# Patient Record
Sex: FEMALE | Race: WHITE | NOT HISPANIC OR LATINO | Employment: OTHER | URBAN - METROPOLITAN AREA
[De-identification: names, ages, dates, MRNs, and addresses within clinical notes are randomized per-mention and may not be internally consistent; named-entity substitution may affect disease eponyms.]

---

## 2018-04-24 ENCOUNTER — TELEPHONE (OUTPATIENT)
Dept: GASTROENTEROLOGY | Facility: CLINIC | Age: 63
End: 2018-04-24

## 2018-04-24 NOTE — TELEPHONE ENCOUNTER
Ma spoke with pt , appt scheduled for 5/8 some of patient records received pt states she will bring more the day of her visit

## 2018-04-24 NOTE — TELEPHONE ENCOUNTER
----- Message from Annia Mccall sent at 4/23/2018 11:33 AM CDT -----  Contact: Matt Lugo- [phone]  Kyle Urena called to schedule a f/u appt on the pts behalf- chron's flare up- please call pt back at [phone]

## 2018-05-08 ENCOUNTER — OFFICE VISIT (OUTPATIENT)
Dept: GASTROENTEROLOGY | Facility: CLINIC | Age: 63
End: 2018-05-08
Payer: MEDICARE

## 2018-05-08 VITALS
SYSTOLIC BLOOD PRESSURE: 181 MMHG | WEIGHT: 155.44 LBS | DIASTOLIC BLOOD PRESSURE: 83 MMHG | BODY MASS INDEX: 30.52 KG/M2 | HEART RATE: 55 BPM | HEIGHT: 60 IN

## 2018-05-08 DIAGNOSIS — R10.31 CHRONIC BILATERAL LOWER ABDOMINAL PAIN: Primary | ICD-10-CM

## 2018-05-08 DIAGNOSIS — R10.32 CHRONIC BILATERAL LOWER ABDOMINAL PAIN: Primary | ICD-10-CM

## 2018-05-08 DIAGNOSIS — G89.29 CHRONIC BILATERAL LOWER ABDOMINAL PAIN: Primary | ICD-10-CM

## 2018-05-08 PROCEDURE — 99213 OFFICE O/P EST LOW 20 MIN: CPT | Mod: PBBFAC | Performed by: INTERNAL MEDICINE

## 2018-05-08 PROCEDURE — 99999 PR PBB SHADOW E&M-EST. PATIENT-LVL III: CPT | Mod: PBBFAC,,, | Performed by: INTERNAL MEDICINE

## 2018-05-08 PROCEDURE — 99204 OFFICE O/P NEW MOD 45 MIN: CPT | Mod: S$PBB,,, | Performed by: INTERNAL MEDICINE

## 2018-05-08 RX ORDER — TRAMADOL HYDROCHLORIDE 50 MG/1
50 TABLET ORAL
COMMUNITY

## 2018-05-08 RX ORDER — METOPROLOL SUCCINATE 50 MG/1
50 TABLET, EXTENDED RELEASE ORAL
COMMUNITY
Start: 2017-08-10 | End: 2018-08-10

## 2018-05-08 RX ORDER — HYDROCHLOROTHIAZIDE 25 MG/1
25 TABLET ORAL DAILY
COMMUNITY

## 2018-05-08 RX ORDER — MONTELUKAST SODIUM 10 MG/1
10 TABLET ORAL NIGHTLY
COMMUNITY

## 2018-05-08 RX ORDER — GABAPENTIN 300 MG/1
600 CAPSULE ORAL
COMMUNITY

## 2018-05-08 RX ORDER — DIPHENOXYLATE HYDROCHLORIDE AND ATROPINE SULFATE 2.5; .025 MG/1; MG/1
1 TABLET ORAL 4 TIMES DAILY PRN
COMMUNITY

## 2018-05-08 RX ORDER — CLONAZEPAM 0.5 MG/1
0.5 TABLET ORAL
COMMUNITY

## 2018-05-08 RX ORDER — SUCRALFATE 1 G/1
1 TABLET ORAL 4 TIMES DAILY
COMMUNITY

## 2018-05-08 RX ORDER — MINERAL OIL
180 ENEMA (ML) RECTAL
COMMUNITY
Start: 2018-04-03

## 2018-05-08 RX ORDER — TRAZODONE HYDROCHLORIDE 150 MG/1
150 TABLET ORAL
COMMUNITY
Start: 2018-03-26

## 2018-05-08 NOTE — LETTER
May 8, 2018      Darin Lugo MD  [address] [place] [address]           [facility]  [address]  [facility] [address]  Phone: [phone]  Fax: [phone]          Patient: Shelly Cormier   MR Number: [id number]   YOB: 1955   Date of Visit: 5/8/2018       Dear Dr. Darin Lugo:    Thank you for referring Shelly Cormier to me for evaluation. Attached you will find relevant portions of my assessment and plan of care.    If you have questions, please do not hesitate to call me. I look forward to following Shelly Cormier along with you.    Sincerely,    Miguel Angel Valle MD    Enclosure  CC:  No Recipients    If you would like to receive this communication electronically, please contact [email or web address] or [phone] to request more information on [employer] Link access.    For providers and/or their staff who would like to refer a patient to [facility], please contact us through our one-stop-shop provider referral line, [facility], at [phone].    If you feel you have received this communication in error or would no longer like to receive these types of communications, please e-mail [email or web address]

## 2018-05-08 NOTE — PROGRESS NOTES
REASON FOR VISIT:  Chronic abdominal pain.    HISTORY OF PRESENT ILLNESS:  Ms. Cormier was once seen before in 2014 for chronic   right lower quadrant abdominal pain upon referral from Dr. Darin Lugo in   [place], [place].  At that time, she had been complaining of abdominal pain   for the past five years.  She had previously been thought to have Crohn disease   based on imaging, but no concrete evidence of Crohn disease was ever found.  She   even underwent a CT enterography, video capsule endoscopy and colonoscopy here,   which was unremarkable.  Biopsies from the terminal ileum and ileocecal valve   unremarkable.  The CT enterography done here in July 2014 revealed cavernous   transformation of the portal vein and inflammatory changes, likely chronic near   the ileocecal junction.  Recently investigation done by Dr. Lugo in April 2018 including CT scan reveals similar findings of inflammation around the   terminal ileum with tethering of the small bowel and the right colon region,   although a colonoscopy and endoscopy were unrevealing including the TI being   normal.  Given her chronic abdominal pain and multiple hospital admits, chronic   nausea, which keeps her awake with constant feeling of sense of vomiting,   abdominal bloating, mostly diarrhea with intermittent constipation, has brought   her here to seek a second opinion.  Today upon reviewing her CT imaging and   recent endoscopic evaluation and rather unremarkable labs, I have offered to   discuss the case with Dr. Kathy Kirkpatrick, inflammatory bowel disease specialist   and with Dr. Hector Loredo, colorectal surgeon, to determine if this surgical   approach would be appropriate at this time, i.e. laparoscopic evaluation with   possible ileocecectomy for diagnosis and possible resection of the inflamed   section of the bowel.    PAST MEDICAL, SURGICAL, SOCIAL AND FAMILY HISTORY:  Unremarkable except for   chronic migraine.    REVIEW OF  SYSTEMS:  CONSTITUTIONAL:  No fever, no chills, no weight loss.  Appetite is stable.  EYES:  No visual changes, no red eyes.  ENT:  No odynophagia or hoarseness of voice.  CARDIOVASCULAR:  No angina or palpitation.  RESPIRATORY:  No shortness breath or wheezing.  GENITOURINARY:  No dysuria or hematuria.  MUSCULOSKELETAL:  Occasional myalgias and arthralgias.  SKIN:  No pruritus, eczema, no painful nodules on the skin, no ulceration.  NEUROLOGIC:  No headache, no seizures.  PSYCHIATRIC:  No anxiety or depression.  GASTROINTESTINAL:  See HPI.    PHYSICAL EXAMINATION:  VITAL SIGNS:  See Epic.  GENERAL:  Awake, alert and oriented x3, no acute distress.  NECK:  Supple, no carotid bruit.  No cervical adenopathy.  ABDOMEN:  Obese, soft, diffuse tenderness to palpation, but mostly in the right   lower quadrant.  No guarding or rigidity.  Bowel sounds are hyperactive.  No   abdominal bruits heard.  EYES:  Conjunctivae anicteric.  ENT:  Oropharynx, mucosa moist.  Mallampati 3.  CARDIOVASCULAR:  S1, S2 normal.  RESPIRATORY:  Bilateral air entry equal.  SKIN:  No palmar erythema or spider angiomata.  NEUROLOGIC:  No asterixis.  PSYCHIATRY:  Affect appropriate.  LOWER EXTREMITIES:  No pedal edema.    IMPRESSION:  Chronic lower abdominal pain with possible chronic inflammatory   stricture involving the terminal ileum.    RECOMMENDATION:  We will discuss with Dr. Kathy Kirkpatrick and Dr. Hector Loredo to   determine if surgical evaluation is necessary at this time.  I suspect we may   not have a diagnosis without surgical evaluation given the longevity of her   symptoms and no clear diagnosis despite multiple endoscopic evaluations and CT   imagings.  We will call the patient with the final recommendation.      ACT/HN  dd: 05/08/2018 15:13:11 (CDT)  td: 05/09/2018 02:36:48 (CDT)  Doc ID   #[id number]  Job ID #[id number]    CC:

## 2018-05-11 ENCOUNTER — TELEPHONE (OUTPATIENT)
Dept: GASTROENTEROLOGY | Facility: CLINIC | Age: 63
End: 2018-05-11

## 2018-05-11 NOTE — TELEPHONE ENCOUNTER
----- Message from Miguel Angel Valle MD sent at 5/11/2018  8:33 AM CDT -----  Please call her and let her know that I've not been able to get in touch with the surgeon yet. I'll let her know ASAP.

## 2018-05-11 NOTE — TELEPHONE ENCOUNTER
----- Message from Ayanna Randolph sent at 5/10/2018  4:32 PM CDT -----  Contact: self - [phone] or   Bryan - wants to know if the surgeons have reviewed her records - still has stomach pain and diarrhea - please call patient at  or

## 2018-05-11 NOTE — TELEPHONE ENCOUNTER
----- Message from Annia Mccall sent at 5/11/2018  4:05 PM CDT -----  Contact: Self- [phone] or [phone]  Leonard- pt called to determine if there were any updates on hearing from her surgeon yet- please contact pt at [phone] or [phone]

## 2018-05-11 NOTE — TELEPHONE ENCOUNTER
Ma spoke with pt , informed her dr cee was still waiting on surgeon will call her ASAP pt verbalized understanding

## 2018-05-14 ENCOUNTER — TELEPHONE (OUTPATIENT)
Dept: GASTROENTEROLOGY | Facility: CLINIC | Age: 63
End: 2018-05-14

## 2018-05-14 NOTE — TELEPHONE ENCOUNTER
Dr. Valle,  I spoke with the patient 3 times on Friday and informed her that you will get back with her ASAP once you speak with Dr. Loredo and Dr. PAPITO Kirkpatrick  Please advise

## 2018-05-14 NOTE — TELEPHONE ENCOUNTER
----- Message from Leslie Rudolph MA sent at 5/14/2018  1:27 PM CDT -----  Contact: Home: [phone] or  [phone]       Who Called: Self     Communication Preference (Lisat response to Pt. (or) Call Back # and timeframe): Home: [phone] or  [phone]  Additional Information: Pt states that Dr Valle told her that he would be speaking to a surgeon about doing surgery on her colon and she is wanting to know if he had the chance to do that yet and what was decided?

## 2018-05-14 NOTE — TELEPHONE ENCOUNTER
Ma spoke with pt and dr cee and patient was advised that if her pain level is a 10 she needs to go to the er, patient was also informed that dr duarte will reach out to her in 2 weeks to schedule an appt once he reviews hier records. Pt verbalized understanding .

## 2018-05-14 NOTE — TELEPHONE ENCOUNTER
----- Message from Annia Cal sent at 5/14/2018  3:33 PM CDT -----  Contact: Self- [phone] or [phone]  Leonard- pt called to determine if Dr. Valle spoke with a surgeon yet on getting her scheduled- states she is having pain in her colon- pain level 10 currently- please contact pt at [phone] or [phone]

## 2018-05-29 ENCOUNTER — LAB VISIT (OUTPATIENT)
Dept: LAB | Facility: HOSPITAL | Age: 63
End: 2018-05-29
Payer: MEDICARE

## 2018-05-29 ENCOUNTER — OFFICE VISIT (OUTPATIENT)
Dept: SURGERY | Facility: CLINIC | Age: 63
End: 2018-05-29
Payer: MEDICARE

## 2018-05-29 VITALS
HEIGHT: 60 IN | SYSTOLIC BLOOD PRESSURE: 166 MMHG | HEART RATE: 68 BPM | DIASTOLIC BLOOD PRESSURE: 64 MMHG | BODY MASS INDEX: 30.04 KG/M2 | WEIGHT: 153 LBS

## 2018-05-29 DIAGNOSIS — K52.9 CHRONIC DIARRHEA: ICD-10-CM

## 2018-05-29 DIAGNOSIS — K52.9 CHRONIC DIARRHEA: Primary | ICD-10-CM

## 2018-05-29 DIAGNOSIS — R10.31 RLQ ABDOMINAL PAIN: ICD-10-CM

## 2018-05-29 LAB
ALBUMIN SERPL BCP-MCNC: 3.5 G/DL
ALP SERPL-CCNC: 112 U/L
ALT SERPL W/O P-5'-P-CCNC: 19 U/L
ANION GAP SERPL CALC-SCNC: 9 MMOL/L
AST SERPL-CCNC: 27 U/L
BASOPHILS # BLD AUTO: 0.03 K/UL
BASOPHILS NFR BLD: 0.4 %
BILIRUB SERPL-MCNC: 1 MG/DL
BUN SERPL-MCNC: 14 MG/DL
CALCIUM SERPL-MCNC: 9.4 MG/DL
CHLORIDE SERPL-SCNC: 107 MMOL/L
CO2 SERPL-SCNC: 27 MMOL/L
CREAT SERPL-MCNC: 0.9 MG/DL
CRP SERPL-MCNC: 20.7 MG/L
DIFFERENTIAL METHOD: ABNORMAL
EOSINOPHIL # BLD AUTO: 0.1 K/UL
EOSINOPHIL NFR BLD: 1.6 %
ERYTHROCYTE [DISTWIDTH] IN BLOOD BY AUTOMATED COUNT: 16 %
ERYTHROCYTE [SEDIMENTATION RATE] IN BLOOD BY WESTERGREN METHOD: 38 MM/HR
EST. GFR  (AFRICAN AMERICAN): >60 ML/MIN/1.73 M^2
EST. GFR  (NON AFRICAN AMERICAN): >60 ML/MIN/1.73 M^2
GLUCOSE SERPL-MCNC: 98 MG/DL
HCT VFR BLD AUTO: 37.2 %
HGB BLD-MCNC: 11.5 G/DL
IMM GRANULOCYTES # BLD AUTO: 0.02 K/UL
IMM GRANULOCYTES NFR BLD AUTO: 0.2 %
LYMPHOCYTES # BLD AUTO: 1.2 K/UL
LYMPHOCYTES NFR BLD: 14.5 %
MCH RBC QN AUTO: 26.8 PG
MCHC RBC AUTO-ENTMCNC: 30.9 G/DL
MCV RBC AUTO: 87 FL
MONOCYTES # BLD AUTO: 1 K/UL
MONOCYTES NFR BLD: 11.7 %
NEUTROPHILS # BLD AUTO: 5.8 K/UL
NEUTROPHILS NFR BLD: 71.6 %
NRBC BLD-RTO: 0 /100 WBC
PLATELET # BLD AUTO: 114 K/UL
PMV BLD AUTO: 13.7 FL
POTASSIUM SERPL-SCNC: 4.4 MMOL/L
PROT SERPL-MCNC: 7.7 G/DL
RBC # BLD AUTO: 4.29 M/UL
SODIUM SERPL-SCNC: 143 MMOL/L
WBC # BLD AUTO: 8.11 K/UL

## 2018-05-29 PROCEDURE — 85025 COMPLETE CBC W/AUTO DIFF WBC: CPT

## 2018-05-29 PROCEDURE — 99213 OFFICE O/P EST LOW 20 MIN: CPT | Mod: PBBFAC | Performed by: COLON & RECTAL SURGERY

## 2018-05-29 PROCEDURE — 36415 COLL VENOUS BLD VENIPUNCTURE: CPT

## 2018-05-29 PROCEDURE — 99999 PR PBB SHADOW E&M-EST. PATIENT-LVL III: CPT | Mod: PBBFAC,,, | Performed by: COLON & RECTAL SURGERY

## 2018-05-29 PROCEDURE — 99204 OFFICE O/P NEW MOD 45 MIN: CPT | Mod: S$PBB,,, | Performed by: COLON & RECTAL SURGERY

## 2018-05-29 PROCEDURE — 80053 COMPREHEN METABOLIC PANEL: CPT

## 2018-05-29 PROCEDURE — 85651 RBC SED RATE NONAUTOMATED: CPT

## 2018-05-29 PROCEDURE — 86140 C-REACTIVE PROTEIN: CPT

## 2018-05-29 RX ORDER — ONDANSETRON HYDROCHLORIDE 8 MG/1
8 TABLET, FILM COATED ORAL EVERY 8 HOURS PRN
Qty: 30 TABLET | Refills: 3 | Status: SHIPPED | OUTPATIENT
Start: 2018-05-29

## 2018-05-29 RX ORDER — HYOSCYAMINE SULFATE 0.125 MG
0.12 TABLET ORAL
COMMUNITY
Start: 2016-08-16

## 2018-05-29 NOTE — PROGRESS NOTES
"Subjective:       Patient ID: Shelly Cormier is a 63 y.o. female.    Chief Complaint: No chief complaint on file.    Ms. Corimer is a 62 yo F who was referred by Dr. Valle (gastroenterology) for evaluation for possible surgical diagnosis/treatment of possible crohn's disease. She has had chronic abdominal pain and diarrhea for years.     She was thought to have Crohn's disease but no pathologic diagnosis has ever been made. She follows with gastroenterology in [place], [place] (Dr. Darin Lugo). She says she has had abdominal pain and diarrhea >10 years. This was bad ~5-6 years ago. The only thing that has worked for her pain and diarrhea has been hospital admissions with IVF and antibiotics. She tried steroids which "made her crazy." She has tried pentasa in the past with no improvement. She is currently on no medications for Crohn's. She had a period of 5 years where things were tolerable but the diarrhea, pain and nausea have been bad for the last 6 months.     She has also been diagnosed with IBS - she has taken hyoscyamine, which she doesn't think has helped.  She has not lost weight. She does have chronic nausea for which she takes phenergan. No emesis. No blood in the diarrhea. She does have occasional incontinence to stool. She feels like she "does not digest food right." The pain is often in the RLQ but moves throughout her abdomen. It is constant. Her pain is not relieved by eating or bowel movements.    Of note, she does have a portal vein thrombosis, splenomegaly and esophageal varices on recent CT scan.     C-scope 4/18/2018 ([facility]) - tubular adenoma of transverse colon, Normal appearing terminal ileum and biopsies with normal mucosa.   C-scope 2016 ([facility])- also normal T.I. And random colon biopsy with normal mucosa as well  C-scope 2014 (Dr. Valle) - normal T.I. And colon, biopsies normal as well  CTE 07/2014 - reportedly (unable to review) portal vein embolism and possible chronic " inflammatory changes near ileocecal junction  Capsule endoscopy 2014 - normal exam of small intestine, reached colon      No family hx of CRC. Her maternal aunt has UC. No other family history of IBD.     PSH:  Appendectomy (open) with SAL - at age 29y  Laparoscopic cholecystectomy  Laparoscopic Nissen fundoplication    Review of patient's allergies indicates:   Allergen Reactions    Corticosteroids (glucocorticoids)     Hydrocodone     Lorcet 10/650 [hydrocodone-acetaminophen]        Past Medical History:   Diagnosis Date    Anxiety     GERD (gastroesophageal reflux disease)     History of migraine headaches     Hypertension     Portal vein thrombosis     after the fundoplication in 2006. Was on anticoagulation for a short time.    Seasonal allergies        Past Surgical History:   Procedure Laterality Date    APPENDECTOMY      CHOLECYSTECTOMY  2013    GASTRIC FUNDOPLICATION      TONSILLECTOMY      TOTAL ABDOMINAL HYSTERECTOMY         Current Outpatient Prescriptions   Medication Sig Dispense Refill    clonazePAM (KLONOPIN) 0.5 MG tablet Take 0.5 mg by mouth.      diphenoxylate-atropine 2.5-0.025 mg (LOMOTIL) 2.5-0.025 mg per tablet Take 1 tablet by mouth 4 (four) times daily as needed for Diarrhea.      fexofenadine (ALLEGRA) 180 MG tablet Take 180 mg by mouth.      gabapentin (NEURONTIN) 300 MG capsule Take 600 mg by mouth.      hydroCHLOROthiazide (HYDRODIURIL) 25 MG tablet Take 25 mg by mouth once daily.      hyoscyamine (ANASPAZ,LEVSIN) 0.125 mg Tab Take 0.125 mg by mouth.      metoprolol succinate (TOPROL-XL) 50 MG 24 hr tablet Take 50 mg by mouth.      montelukast (SINGULAIR) 10 mg tablet Take 10 mg by mouth every evening.      omeprazole (PRILOSEC ORAL) Take by mouth.      promethazine (PHENERGAN) 50 MG tablet Take 50 mg by mouth every 4 (four) hours.      ranitidine (ZANTAC) 150 MG tablet       sucralfate (CARAFATE) 1 gram tablet Take 1 g by mouth 4 (four) times daily.       sumatriptan (IMITREX) 100 MG tablet Take 100 mg by mouth every 2 (two) hours as needed for Migraine.      traMADol (ULTRAM) 50 mg tablet Take 50 mg by mouth.      traZODone (DESYREL) 150 MG tablet Take 150 mg by mouth.      ondansetron (ZOFRAN) 8 MG tablet Take 1 tablet (8 mg total) by mouth every 8 (eight) hours as needed for Nausea. 30 tablet 3     No current facility-administered medications for this visit.        Family History   Problem Relation Age of Onset    Ulcerative colitis Maternal Aunt        Social History     Social History    Marital status:      Spouse name: N/A    Number of children: N/A    Years of education: N/A     Social History Main Topics    Smoking status: Never Smoker    Smokeless tobacco: None    Alcohol use No    Drug use: Unknown    Sexual activity: Not Asked     Other Topics Concern    None     Social History Narrative    None       Review of Systems   Constitutional: Positive for fatigue. Negative for chills, diaphoresis, fever and unexpected weight change.   HENT: Negative for congestion.    Eyes: Negative for visual disturbance.   Respiratory: Negative for cough and shortness of breath.    Cardiovascular: Negative for chest pain.   Gastrointestinal: Positive for abdominal pain, diarrhea and nausea. Negative for anal bleeding, blood in stool, constipation, rectal pain and vomiting.   Genitourinary: Negative for dysuria.   Musculoskeletal: Negative for arthralgias.   Skin: Negative for color change.   Neurological: Negative for dizziness and syncope.   Hematological: Negative for adenopathy.   Psychiatric/Behavioral: Negative for agitation.       Objective:      Physical Exam   Constitutional: She is oriented to person, place, and time. She appears well-developed and well-nourished.   HENT:   Head: Normocephalic and atraumatic.   Eyes: EOM are normal.   Cardiovascular: Normal rate and regular rhythm.    Pulmonary/Chest: Effort normal. No respiratory distress.    Abdominal: Soft. She exhibits no distension and no mass. There is tenderness (mild and diffuse throughout abdomen). There is no rebound and no guarding. No hernia.   Previous pfannenstiel incision well healed  Prior laparoscopic incisions well healed   Musculoskeletal: Normal range of motion.   Neurological: She is alert and oriented to person, place, and time.   Skin: Skin is warm. Capillary refill takes less than 2 seconds.   Psychiatric: She has a normal mood and affect. Her behavior is normal.   Nursing note and vitals reviewed.        Lab Results   Component Value Date    WBC 8.11 05/29/2018    HGB 11.5 (L) 05/29/2018    HCT 37.2 05/29/2018    MCV 87 05/29/2018     (L) 07/16/2014     BMP  Lab Results   Component Value Date     07/16/2014    K 4.1 07/16/2014     07/16/2014    CO2 25 07/16/2014    BUN 15 07/16/2014    CREATININE 0.9 07/16/2014    CALCIUM 9.5 07/16/2014    ANIONGAP 12 07/16/2014    ESTGFRAFRICA >60.0 07/16/2014    EGFRNONAA >60.0 07/16/2014     CMP  Sodium   Date Value Ref Range Status   07/16/2014 144 136 - 145 mmol/L Final     Potassium   Date Value Ref Range Status   07/16/2014 4.1 3.5 - 5.1 mmol/L Final     Chloride   Date Value Ref Range Status   07/16/2014 107 95 - 110 mmol/L Final     CO2   Date Value Ref Range Status   07/16/2014 25 23 - 29 mmol/L Final     Glucose   Date Value Ref Range Status   07/16/2014 97 70 - 110 mg/dL Final     BUN, Bld   Date Value Ref Range Status   07/16/2014 15 6 - 20 mg/dL Final     Creatinine   Date Value Ref Range Status   07/16/2014 0.9 0.5 - 1.4 mg/dL Final     Calcium   Date Value Ref Range Status   07/16/2014 9.5 8.7 - 10.5 mg/dL Final     Total Protein   Date Value Ref Range Status   07/16/2014 8.2 6.0 - 8.4 g/dL Final     Albumin   Date Value Ref Range Status   07/16/2014 3.7 3.5 - 5.2 g/dL Final     Total Bilirubin   Date Value Ref Range Status   07/16/2014 0.7 0.1 - 1.0 mg/dL Final     Comment:     For infants and newborns,  interpretation of results should be based  on gestational age, weight and in agreement with clinical  observations.  Premature Infant recommended reference ranges:  Up to 24 hours.............<8.0 mg/dL  Up to 48 hours............<12.0 mg/dL  3-5 days..................<15.0 mg/dL  6-29 days.................<15.0 mg/dL       Alkaline Phosphatase   Date Value Ref Range Status   07/16/2014 133 55 - 135 U/L Final     AST   Date Value Ref Range Status   07/16/2014 30 10 - 40 U/L Final     ALT   Date Value Ref Range Status   07/16/2014 25 10 - 44 U/L Final     Anion Gap   Date Value Ref Range Status   07/16/2014 12 8 - 16 mmol/L Final     eGFR if    Date Value Ref Range Status   07/16/2014 >60.0 >60 mL/min/1.73 m^2 Final     eGFR if non    Date Value Ref Range Status   07/16/2014 >60.0 >60 mL/min/1.73 m^2 Final     Comment:     Calculation used to obtain the estimated glomerular filtration  rate (eGFR) is the CKD-EPI equation. Since race is unknown   in our information system, the eGFR values for   -American and Non--American patients are given   for each creatinine result.       No results found for: CEA    CT scan reviewed - (outside)  Stomach and duodenum filled with fluid/food and distended  Splenomegaly, esophageal varices noted  Clips in RLQ c/w previous appendectomy  No thickening of terminal ileum, no lymphadenopathy, free fluid or mesenteric edema     Assessment:       1. Chronic diarrhea    2. RLQ abdominal pain        Plan:       Unclear if she has Crohn's disease, though based on studies above, it does not appear that she does. We discussed that surgery is not low risk given her previous operations and portal hypertension and it is unlikely to fix her abdominal pain, which is her main complaint.     Given her history of Nissen and complaints of nausea and distension of stomach and proximal SB on CT, will rule out upper GI source for pain.      - Upper GI with SBFT  - she will do this in 2 weeks and follow up in clinic after  - Antiemetics prescribed    Maya Sarabia MD  Colon and Rectal Surgery Fellow    I have interviewed and examined the patient, reviewed the notes and assessments, and/or personally supervised the procedure(s) performed by No, and I concur with her/his documentation of Shelly Cormier.  See below addendum for my evaluation and additional findings.    There is no definitive evidence of Crohn's especially given normal endoscopies.  Suspect she has symptoms more c/w adhesive disease, IBS, and/or visceral hypersensitivity.  Recent CT also shows markedly dilated stomach.  Check labs and UGI/SBFT.    Total visit time was 45 minutes, more than 50% of which was spent in face-to-face counseling with patient.    Hector Loredo MD, FACS, FASCRS  Staff Surgeon  Department of Colon & Rectal Surgery

## 2018-05-29 NOTE — LETTER
June 8, 2018      Miguel Angel Valle MD  [address]  [facility] [address]           [facility]  [address]  [facility] [address]  Phone: [phone]          Patient: Shelly Cormier   MR Number: [id number]   YOB: 1955   Date of Visit: 5/29/2018       Dear Dr. Miguel Angel Valle:    Thank you for referring Shelly Cormier to me for evaluation. Attached you will find relevant portions of my assessment and plan of care.    If you have questions, please do not hesitate to call me. I look forward to following Shelly Cormier along with you.    Sincerely,    Hector oLredo MD    Enclosure  CC:  No Recipients    If you would like to receive this communication electronically, please contact [email or web address] or [phone] to request more information on [employer] access.    For providers and/or their staff who would like to refer a patient to [facility], please contact us through our one-stop-shop provider referral line, [facility], at [phone].    If you feel you have received this communication in error or would no longer like to receive these types of communications, please e-mail [email or web address]

## 2018-06-08 ENCOUNTER — TELEPHONE (OUTPATIENT)
Dept: RADIOLOGY | Facility: HOSPITAL | Age: 63
End: 2018-06-08

## 2018-06-11 ENCOUNTER — HOSPITAL ENCOUNTER (OUTPATIENT)
Dept: RADIOLOGY | Facility: HOSPITAL | Age: 63
Discharge: HOME | End: 2018-06-11
Attending: SURGERY
Payer: MEDICARE

## 2018-06-11 ENCOUNTER — OFFICE VISIT (OUTPATIENT)
Dept: SURGERY | Facility: CLINIC | Age: 63
End: 2018-06-11
Payer: MEDICARE

## 2018-06-11 VITALS
HEIGHT: 60 IN | SYSTOLIC BLOOD PRESSURE: 145 MMHG | BODY MASS INDEX: 31.08 KG/M2 | WEIGHT: 158.31 LBS | DIASTOLIC BLOOD PRESSURE: 63 MMHG | HEART RATE: 78 BPM

## 2018-06-11 DIAGNOSIS — R10.31 RLQ ABDOMINAL PAIN: ICD-10-CM

## 2018-06-11 DIAGNOSIS — K52.9 CHRONIC DIARRHEA: ICD-10-CM

## 2018-06-11 PROCEDURE — 99999 PR PBB SHADOW E&M-EST. PATIENT-LVL III: CPT | Mod: PBBFAC,,, | Performed by: COLON & RECTAL SURGERY

## 2018-06-11 PROCEDURE — 74245 FL UPPER GI WITH SMALL BOWEL: CPT | Mod: TC,FY

## 2018-06-11 PROCEDURE — 99213 OFFICE O/P EST LOW 20 MIN: CPT | Mod: S$PBB,,, | Performed by: COLON & RECTAL SURGERY

## 2018-06-11 PROCEDURE — 74245 FL UPPER GI WITH SMALL BOWEL: CPT | Mod: 26,,, | Performed by: RADIOLOGY

## 2018-06-11 PROCEDURE — 99213 OFFICE O/P EST LOW 20 MIN: CPT | Mod: PBBFAC,25 | Performed by: COLON & RECTAL SURGERY

## 2018-06-11 NOTE — LETTER
June 11, 2018      Miguel Angel Valle MD  [address]  [facility] [address]           [facility]  [address]  [facility] [address]  Phone: [phone]          Patient: Shelly Cormier   MR Number: [id number]   YOB: 1955   Date of Visit: 6/11/2018       Dear No ref. provider found:    Thank you for referring Shelly Cormier to me for evaluation. Attached you will find relevant portions of my assessment and plan of care.    If you have questions, please do not hesitate to call me. I look forward to following Shelly Cormier along with you.    Sincerely,    Hector Loredo MD    Enclosure  CC:  No Recipients    If you would like to receive this communication electronically, please contact [email or web address] or [phone] to request more information on [employer] Link access.    For providers and/or their staff who would like to refer a patient to [facility], please contact us through our one-stop-shop provider referral line, [facility], at [phone].    If you feel you have received this communication in error or would no longer like to receive these types of communications, please e-mail [email or web address]

## 2018-06-11 NOTE — PROGRESS NOTES
"Subjective:       Patient ID: Shelly Cormier is a 63 y.o. female.    Chief Complaint: No chief complaint on file.    HPI   Initial office visit 5/29/18:  Ms. Cormier is a 64 yo F who was referred by Dr. Valle (gastroenterology) for evaluation for possible surgical diagnosis/treatment of possible crohn's disease. She has had chronic abdominal pain and diarrhea for years. She was thought to have Crohn's disease but no pathologic diagnosis has ever been made. She follows with gastroenterology in [place], [place] (Dr. Darin Lugo). She says she has had abdominal pain and diarrhea >10 years. This was bad ~5-6 years ago. The only thing that has worked for her pain and diarrhea has been hospital admissions with IVF and antibiotics. She tried steroids which "made her crazy." She has tried pentasa in the past with no improvement. She is currently on no medications for Crohn's. She had a period of 5 years where things were tolerable but the diarrhea, pain and nausea have been bad for the last 6 months. She has also been diagnosed with IBS - she has taken hyoscyamine, which she doesn't think has helped.  She has not lost weight. She does have chronic nausea for which she takes phenergan. No emesis. No blood in the diarrhea. She does have occasional incontinence to stool. She feels like she "does not digest food right." The pain is often in the RLQ but moves throughout her abdomen. It is constant. Her pain is not relieved by eating or bowel movements. Of note, she does have a portal vein thrombosis, splenomegaly and esophageal varices on recent CT scan. No family hx of CRC. Her maternal aunt has UC. No other family history of IBD. Past surgical history is significant for appendectomy, cholecystectomy and Nissen fundoplication.    Follow up Office Visit 6/11/18:   She returns to office after obtaining a small bowel follow through and labwork. She reports that her symptoms are unchanged since her last visit. Her RLQ " abdominal pain and nausea are always present and not made better or worse by eating.       Review of Systems    Positive for fatigue. Negative for chills, diaphoresis, fever and unexpected weight change.    Gastrointestinal: Positive for abdominal pain, diarrhea and nausea.  Rest of 14pt ROS has been completed and is otherwise negative.    Objective:       BP (!) 145/63   Pulse 78   Ht 5' (1.524 m)   Wt 71.8 kg (158 lb 4.6 oz)   BMI 30.91 kg/m²     Physical Exam    Vital signs: reviewed above.   Constitutional: well-developed, no apparent distress  Speech/Language: intact  Head: normocephalic, atraumatic   Neurological: E4V5M6, Pupils equal & round, EOMI, Moves all extremities spontaneously  Cardiovascular: RRR, Distal pulses intact, Cap refill < 3s  Respiratory: Non-distressed on RA, symmetric chest expansion, no stridor  Abdomen: soft, non-tender, non-distended   Skin: Warm, Dry, intact    Labs 05/29/18  WBC 8.11   Hemoglobin 11.5*   Hematocrit 37.2   Platelets 114*     Chemistry: WNL    CRP: 20.7  ESR: 38    SBFT 6/11/18:     + gastroesophageal reflux    C-scope 4/18/2018 (Good) - tubular adenoma of transverse colon, Normal appearing terminal ileum and biopsies with normal mucosa.   C-scope 2016 (Good)- also normal T.I. And random colon biopsy with normal mucosa as well  C-scope 2014 (Dr. Valle) - normal T.I. And colon, biopsies normal as well  CTE 07/2014 - reportedly (unable to review) portal vein embolism and possible chronic inflammatory changes near ileocecal junction  Capsule endoscopy 2014 - normal exam of small intestine, reached colon      Assessment:       Chronic diarrhea and RLQ abdominal pain 2/2 IBS.  Crohn's unlikely given multiple negative biopsies on colonoscopy.    Plan:    see attending note.       I have interviewed and examined the patient, reviewed the notes and assessments, and/or personally supervised the procedure(s) performed by Dr. Soares, and I concur with her/his  documentation of Shelly Cormier.  See below addendum for my evaluation and additional findings.    UGI/SBFT today:  Esophageal dysmotility.  Esophageal varices.  Nissen fundoplication.    There does not appear to be any evidence of small-bowel stricture or obstruction.  There is no mention of small bowel transit time in the report but on review of the films, there does not appear to be contrast in the colon on the 3 hr film, but there does appear to be contrast in the colon on the 4 hr film, indicating a mild delay in transit.    She continues to have chronic right lower quadrant pain, which has no temporal relation shipped to eating or bowel movements.    I do not think that she has Crohn's disease, and I do not think that there is a surgical etiology for her pain.  I suspect she has a combination of irritable bowel syndrome along with visceral hypersensitivity.    I have listed her case to be discussed at our Colorectal surgery multidisciplinary conference on 06/27/2018.  Further recommendations will follow after this.    Hector Loredo MD, FACS, FASCRS  Staff Surgeon  Department of Colon & Rectal Surgery

## 2018-06-11 NOTE — ASSESSMENT & PLAN NOTE
Unclear if she has Crohn's disease, though based on studies above, it does not appear that she does. We discussed that surgery is not low risk given her previous operations and portal hypertension and it is unlikely to fix her abdominal pain, which is her main complaint.      Given her history of Nissen and complaints of nausea and distension of stomach and proximal SB on CT, will rule out upper GI source for pain.

## 2019-09-30 ENCOUNTER — TELEPHONE (OUTPATIENT)
Dept: GASTROENTEROLOGY | Facility: CLINIC | Age: 64
End: 2019-09-30

## 2019-09-30 DIAGNOSIS — R10.31 RLQ ABDOMINAL PAIN: Primary | ICD-10-CM

## 2019-09-30 NOTE — TELEPHONE ENCOUNTER
----- Message from Miguel Angel Valle MD sent at 9/30/2019  8:54 AM CDT -----  Received recent medical records form [facility]. Please check with patient if she wants to pursue further investigation. If so please schedule a CT scan first and colonoscopy to follow. Records to be scanned.

## 2019-10-04 ENCOUNTER — TELEPHONE (OUTPATIENT)
Dept: GASTROENTEROLOGY | Facility: CLINIC | Age: 64
End: 2019-10-04

## 2019-10-04 NOTE — TELEPHONE ENCOUNTER
----- Message from Tanya Ramirez sent at 10/4/2019 10:14 AM CDT -----  Contact: Ayanna / DEEPIKA Sommers [phone]  Ayanna requesting a call to discuss records and plan of care for the pt. Ayanna can be reached at [phone].

## 2019-10-04 NOTE — TELEPHONE ENCOUNTER
Ma spoke with Ayanna and she stated she spoke with pt and pt states she will continue [facility] care with [facility]